# Patient Record
Sex: FEMALE | URBAN - METROPOLITAN AREA
[De-identification: names, ages, dates, MRNs, and addresses within clinical notes are randomized per-mention and may not be internally consistent; named-entity substitution may affect disease eponyms.]

---

## 2022-12-02 ENCOUNTER — OFFICE VISIT (OUTPATIENT)
Dept: URBAN - METROPOLITAN AREA CLINIC 25 | Facility: CLINIC | Age: 47
End: 2022-12-02

## 2022-12-02 NOTE — HPI-TODAY'S VISIT:
December 22 visit: Patient was referred for anemia.  Labs from October 22 revealed a hemoglobin of 8.9 with a low MCV of 75, hepatitis C antibody negative, hepatitis B surface antigen negative, HIV negative, normal liver enzymes.  Patient does admit to heavy menstrual bleeding.